# Patient Record
Sex: MALE | Race: WHITE | URBAN - METROPOLITAN AREA
[De-identification: names, ages, dates, MRNs, and addresses within clinical notes are randomized per-mention and may not be internally consistent; named-entity substitution may affect disease eponyms.]

---

## 2021-12-09 ENCOUNTER — OFFICE VISIT (OUTPATIENT)
Dept: URBAN - METROPOLITAN AREA CLINIC 6 | Facility: CLINIC | Age: 21
End: 2021-12-09
Payer: COMMERCIAL

## 2021-12-09 PROCEDURE — 92012 INTRM OPH EXAM EST PATIENT: CPT | Performed by: OPTOMETRIST

## 2021-12-09 RX ORDER — VALACYCLOVIR 500 MG/1
500 MG TABLET, FILM COATED ORAL
Qty: 30 | Refills: 1 | Status: ACTIVE
Start: 2021-12-09

## 2021-12-09 RX ORDER — PREDNISOLONE ACETATE 10 MG/ML
1 % SUSPENSION/ DROPS OPHTHALMIC
Qty: 5 | Refills: 1 | Status: INACTIVE
Start: 2021-12-09 | End: 2022-03-30

## 2021-12-09 ASSESSMENT — INTRAOCULAR PRESSURE
OD: 7
OS: 7

## 2021-12-09 NOTE — IMPRESSION/PLAN
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 Plan: HSV keratitis - herpetic ulcers - Start Valacyclovir 500mg po TID (take 2 on first dose - loading dose). May use pred TID OU. Recheck Monday (note explained to never take pred without the antiviral medication!!!).

## 2021-12-13 ENCOUNTER — OFFICE VISIT (OUTPATIENT)
Dept: URBAN - METROPOLITAN AREA CLINIC 6 | Facility: CLINIC | Age: 21
End: 2021-12-13
Payer: COMMERCIAL

## 2021-12-13 PROCEDURE — 92012 INTRM OPH EXAM EST PATIENT: CPT | Performed by: OPTOMETRIST

## 2021-12-13 ASSESSMENT — INTRAOCULAR PRESSURE
OS: 9
OD: 8

## 2021-12-13 NOTE — IMPRESSION/PLAN
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 Plan: Pt feels significant improvement after starting valacyclovir po TID. Finish the ten day course and then refill and use QD. Scheduled appt to follow up with Dr. Cara Calderon in 2-3 weeks.

## 2022-03-30 ENCOUNTER — OFFICE VISIT (OUTPATIENT)
Dept: URBAN - METROPOLITAN AREA CLINIC 6 | Facility: CLINIC | Age: 22
End: 2022-03-30
Payer: COMMERCIAL

## 2022-03-30 DIAGNOSIS — H16.013 BILATERAL CENTRAL CORNEAL ULCERS: Primary | ICD-10-CM

## 2022-03-30 DIAGNOSIS — B00.9 HERPESVIRAL INFECTION, UNSPECIFIED: ICD-10-CM

## 2022-03-30 PROCEDURE — 92012 INTRM OPH EXAM EST PATIENT: CPT | Performed by: OPHTHALMOLOGY

## 2022-03-30 RX ORDER — PREDNISOLONE ACETATE 10 MG/ML
1 % SUSPENSION/ DROPS OPHTHALMIC
Qty: 10 | Refills: 1 | Status: ACTIVE
Start: 2022-03-30

## 2022-03-30 RX ORDER — ACYCLOVIR 400 MG/1
400 MG TABLET ORAL
Qty: 90 | Refills: 3 | Status: ACTIVE
Start: 2022-03-30

## 2022-03-30 ASSESSMENT — INTRAOCULAR PRESSURE
OS: 9
OD: 9

## 2022-03-30 NOTE — IMPRESSION/PLAN
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 Plan: Remains improved. Ran out of  valacyclovir. Restart acyclovir 400 mg po BID/ cont PF TID ou. Lipid keratopathy improving.

## 2022-05-10 ENCOUNTER — OFFICE VISIT (OUTPATIENT)
Dept: URBAN - METROPOLITAN AREA CLINIC 6 | Facility: CLINIC | Age: 22
End: 2022-05-10
Payer: COMMERCIAL

## 2022-05-10 DIAGNOSIS — H16.013 BILATERAL CENTRAL CORNEAL ULCERS: ICD-10-CM

## 2022-05-10 DIAGNOSIS — B00.9 HERPESVIRAL INFECTION, UNSPECIFIED: Primary | ICD-10-CM

## 2022-05-10 PROCEDURE — 92012 INTRM OPH EXAM EST PATIENT: CPT | Performed by: OPHTHALMOLOGY

## 2022-05-10 ASSESSMENT — INTRAOCULAR PRESSURE
OS: 11
OD: 8

## 2022-05-10 NOTE — IMPRESSION/PLAN
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 Increase acyclovir 400 mg po QID and  pred gtts QID OU.   Recheck 1 week, sooner prn

## 2023-04-20 ENCOUNTER — OFFICE VISIT (OUTPATIENT)
Dept: URBAN - METROPOLITAN AREA CLINIC 6 | Facility: CLINIC | Age: 23
End: 2023-04-20
Payer: COMMERCIAL

## 2023-04-20 DIAGNOSIS — H16.013 BILATERAL CENTRAL CORNEAL ULCERS: Primary | ICD-10-CM

## 2023-04-20 PROCEDURE — 92014 COMPRE OPH EXAM EST PT 1/>: CPT | Performed by: OPTOMETRIST

## 2023-04-20 RX ORDER — PREDNISOLONE ACETATE 10 MG/ML
1 % SUSPENSION/ DROPS OPHTHALMIC
Qty: 10 | Refills: 1 | Status: ACTIVE
Start: 2023-04-20

## 2023-04-20 RX ORDER — VALACYCLOVIR 500 MG/1
500 MG TABLET, FILM COATED ORAL
Qty: 30 | Refills: 3 | Status: ACTIVE
Start: 2023-04-20

## 2023-04-20 ASSESSMENT — INTRAOCULAR PRESSURE
OS: 8
OD: 8

## 2023-04-20 NOTE — IMPRESSION/PLAN
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 Will switch from acyclovir to valacyclovir (500 mg po BID) and continue pred gtts BID OU. Referral given for the patient to see Dr. Whit Trammell.

## 2025-07-30 ENCOUNTER — OFFICE VISIT (OUTPATIENT)
Dept: URBAN - METROPOLITAN AREA CLINIC 6 | Facility: CLINIC | Age: 25
End: 2025-07-30

## 2025-07-30 DIAGNOSIS — H16.013 BILATERAL CENTRAL CORNEAL ULCERS: Primary | ICD-10-CM

## 2025-07-30 PROCEDURE — 92012 INTRM OPH EXAM EST PATIENT: CPT | Performed by: OPTOMETRIST

## 2025-07-30 RX ORDER — ACYCLOVIR 400 MG/1
400 MG TABLET ORAL
Qty: 90 | Refills: 3 | Status: ACTIVE
Start: 2025-07-30

## 2025-07-30 RX ORDER — NEOMYCIN SULFATE, POLYMYXIN B SULFATE AND DEXAMETHASONE 1; 3.5; 1 MG/ML; MG/ML; [USP'U]/ML
SUSPENSION OPHTHALMIC
Qty: 5 | Refills: 0 | Status: ACTIVE
Start: 2025-07-30